# Patient Record
(demographics unavailable — no encounter records)

---

## 2025-04-01 NOTE — HISTORY OF PRESENT ILLNESS
[Fear of Gaining Weight] : has fear of gaining weight [Preoccupation with Food, Shape and Weight] : no preoccupation with food, shape or weight [Distorted  Body Image] : distorted body image [Purging ___] : no purging [Binging ___] : no binging [Diet Pills] : no diet pills [Emetics] : no emetics [Laxatives] : no laxatives [Diuretics] : no diuretics [Supplements] : no supplements [de-identified] : 15 y/o F with PMDD no other medical issues   Meds: OCP for PMDD sees gynecology, lexapro 10mg, daily multivitamin therapist: weekly pmd prescribes SSRI Allergies: seasonal Hospitalizations: none FH: dad has HTN  Social history: lives at home with parents, Sidra (puppy), 10th grade, [place], has friends at school, no bullying, early childhood education   [de-identified] : 138lbs [de-identified] : January 2024 [de-identified] : 112lbs [de-identified] : 2 weeks ago [de-identified] : 116lbs  [de-identified] : see RD note  [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

## 2025-04-01 NOTE — HISTORY OF PRESENT ILLNESS
[Fear of Gaining Weight] : has fear of gaining weight [Preoccupation with Food, Shape and Weight] : no preoccupation with food, shape or weight [Distorted  Body Image] : distorted body image [Purging ___] : no purging [Binging ___] : no binging [Diet Pills] : no diet pills [Emetics] : no emetics [Laxatives] : no laxatives [Diuretics] : no diuretics [Supplements] : no supplements [de-identified] : 15 y/o F with PMDD no other medical issues   Meds: OCP for PMDD sees gynecology, lexapro 10mg, daily multivitamin therapist: weekly pmd prescribes SSRI Allergies: seasonal Hospitalizations: none FH: dad has HTN  Social history: lives at home with parents, Sidra (puppy), 10th grade, [place], has friends at school, no bullying, early childhood education   [de-identified] : 138lbs [de-identified] : January 2024 [de-identified] : 112lbs [de-identified] : 2 weeks ago [de-identified] : 116lbs  [de-identified] : see RD note  [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